# Patient Record
Sex: MALE | Race: WHITE | URBAN - METROPOLITAN AREA
[De-identification: names, ages, dates, MRNs, and addresses within clinical notes are randomized per-mention and may not be internally consistent; named-entity substitution may affect disease eponyms.]

---

## 2017-11-14 ENCOUNTER — OFFICE VISIT (OUTPATIENT)
Dept: OTOLARYNGOLOGY | Facility: CLINIC | Age: 32
End: 2017-11-14

## 2017-11-14 ENCOUNTER — TELEPHONE (OUTPATIENT)
Dept: OTOLARYNGOLOGY | Facility: CLINIC | Age: 32
End: 2017-11-14

## 2017-11-14 VITALS
TEMPERATURE: 99 F | BODY MASS INDEX: 30.92 KG/M2 | DIASTOLIC BLOOD PRESSURE: 82 MMHG | HEART RATE: 99 BPM | SYSTOLIC BLOOD PRESSURE: 136 MMHG | WEIGHT: 216 LBS | HEIGHT: 70 IN

## 2017-11-14 DIAGNOSIS — H91.90 HEARING LOSS, UNSPECIFIED HEARING LOSS TYPE, UNSPECIFIED LATERALITY: Primary | ICD-10-CM

## 2017-11-14 PROCEDURE — 99212 OFFICE O/P EST SF 10 MIN: CPT | Performed by: OTOLARYNGOLOGY

## 2017-11-14 PROCEDURE — 99203 OFFICE O/P NEW LOW 30 MIN: CPT | Performed by: OTOLARYNGOLOGY

## 2017-11-14 NOTE — TELEPHONE ENCOUNTER
Lei Rodarte from [facility] office requesting OV notes to create a referral for HT. Please fax notes to [phone]. Please advise.  Thank you

## 2017-11-14 NOTE — TELEPHONE ENCOUNTER
Dr. Steffi Matthews, please advise when you have completed pt's OV note so we may fax it to pt's PCP's office.

## 2017-11-15 NOTE — PROGRESS NOTES
Gerri Johnson is a 33year old male.   Patient presents with:  Hearing Loss: patient presents for failed tympanogram on left side on work physical and was referred by pcp       HISTORY OF PRESENT ILLNESS  He presents with a history of occasional ear proble Patient Position: Sitting, Cuff Size: adult)   Pulse 99   Temp 99.1 °F (37.3 °C) (Tympanic)   Ht 5' 10\" (1.778 m)   Wt 216 lb (98 kg)   BMI 30.99 kg/m²        Constitutional Normal Overall appearance - Normal.   Psychiatric Normal Orientation - Oriented t without any further intervention. Middle ear issues are noted he may benefit from placement of tubes so that he may proceed with his  program.  - SPECIALTY (OTHER) - EXTERNAL            Thor Velasco MD    11/14/2017    6:33 PM

## 2017-11-15 NOTE — TELEPHONE ENCOUNTER
Nalini Denise from Dr. Ortega Shirland office following up on OV notes,  Please fax to [facility] [address]. Thank you.

## (undated) NOTE — LETTER
Jeremy, [facility], [address]       11/14/17        Patient: Berto Murphy   YOB: 1985   Date of Visit: 11/14/2017       Dear  Dr. Arti Blair,      Thank you for referring Berto Murphy to my pract